# Patient Record
(demographics unavailable — no encounter records)

---

## 2025-04-10 NOTE — REVIEW OF SYSTEMS
[Fatigue] : no fatigue [Decreased Appetite] : appetite not decreased [Visual Field Defect] : no visual field defect [Dysphagia] : no dysphagia [Dysphonia] : no dysphonia [Chest Pain] : no chest pain [Palpitations] : no palpitations [Shortness Of Breath] : no shortness of breath [Cough] : no cough [Nausea] : no nausea [Vomiting] : no vomiting [Diarrhea] : no diarrhea [Polyuria] : no polyuria [Irregular Menses] : regular menses [Joint Pain] : no joint pain [Muscle Weakness] : no muscle weakness [Acanthosis] : no acanthosis  [Acne] : no acne [Headaches] : no headaches [Tremors] : no tremors [Depression] : no depression [Polydipsia] : no polydipsia [Easy Bleeding] : no ~M tendency for easy bleeding

## 2025-04-10 NOTE — PHYSICAL EXAM
[Alert] : alert [Well Nourished] : well nourished [No Acute Distress] : no acute distress [Normal Sclera/Conjunctiva] : normal sclera/conjunctiva [PERRL] : pupils equal, round and reactive to light [Normal Outer Ear/Nose] : the ears and nose were normal in appearance [Normal TMs] : both tympanic membranes were normal [No Neck Mass] : no neck mass was observed [Thyroid Not Enlarged] : the thyroid was not enlarged [No Respiratory Distress] : no respiratory distress [Clear to Auscultation] : lungs were clear to auscultation bilaterally [Normal S1, S2] : normal S1 and S2 [Normal Rate] : heart rate was normal [Regular Rhythm] : with a regular rhythm [Normal Bowel Sounds] : normal bowel sounds [Not Tender] : non-tender [Soft] : abdomen soft [Normal Gait] : normal gait [No Clubbing, Cyanosis] : no clubbing  or cyanosis of the fingernails [No Joint Swelling] : no joint swelling seen [No Rash] : no rash [No Skin Lesions] : no skin lesions [Oriented x3] : oriented to person, place, and time [Normal Insight/Judgement] : insight and judgment were intact

## 2025-04-10 NOTE — HISTORY OF PRESENT ILLNESS
[FreeTextEntry1] : 53 year old female with PMH of CVID here for evaluation of abnormal TFTS  Patient has confabulated speech and is very anxious.  I am unable to bring her to the main story.    Hypothyroidism: 10 years  -Currently On Levothyroxine 125 mcg daily  - 6 months on this dose  -Increased tremors and HP  -Irregular bowel movements  -IN menopause  -No hair loss   She brought her neck US results with her- showing a heterogenous thyroid    -Patient has self-diagnosed herself with ANA LILIA

## 2025-04-10 NOTE — ASSESSMENT
[FreeTextEntry1] : 53 year old female with very high anxiety, confabulated speech here for evaluation of hypothyroidism.  Very difficult to have a conversation, since her speech was very disorganized.   Hypothyroidism:  -Check TFTs  -Continue Levothyroxine 125 mcg daily for now  -Clinically euthyroid   Elevated Bg: -Patient is convinced she has ANA LILIA -Currently not on insulin -Advised to check insulin ab to make the decision  -Follow up in 6 months